# Patient Record
(demographics unavailable — no encounter records)

---

## 2024-12-17 NOTE — ASSESSMENT
[FreeTextEntry1] : OP, postmenopausal  Last DEXA 08/2024: AP spine: T score: -2.7, Left femoral neck: -1.7, Left total hip: -1.6  - labs as below. will call pt with results - Reports taking Prolia every 6 months. Tolerating medication well. No side effects.  - Due for 4th dose 03/2025. Was getting Prolia by Endocrinology at [facility]- she is unable to commute there now - Previous trial of alendronate: discontinued due to allergic reaction - Had extensive discussion with pt today about treatment options. At this time the decision was made to start treatment with SC Prolia. Next dose 03/2025. - calcium and vitamin d supplementation.  - regular exercise: aerobic and resistance - fall prevention - smoking cessation  Discussed treatment plan with the patient. The patient was given the opportunity to ask questions and all questions were answered to their satisfaction.

## 2024-12-17 NOTE — HISTORY OF PRESENT ILLNESS
[FreeTextEntry1] : 61 year old female with PMH as listed below presents today for an initial evaluation for OP  Recent DEXA 08/2024: AP spine: T score: -2.7 Left femoral neck: -1.7 Left total hip: -1.6  - Reports taking Prolia every 6 months. Tolerating medication well. No side effects. Due for 4th dose 03/2025 - Was getting Prolia by Endocrinology at [facility] [place]- but she is unable to commute there now - Previous trial of alendronate: discontinued due to allergic reaction - No history of fractures - No family history of hip fractures - Occasional smoker - No history of oral estrogen use - No falls at home - NO upcoming or planned jaw surgery ,extractions, etc.  Chart/ labs reviewed

## 2025-03-21 NOTE — ASSESSMENT
[FreeTextEntry1] : OP, postmenopausal  Last DEXA 08/2024: AP spine: T score: -2.7, Left femoral neck: -1.7, Left total hip: -1.6  - Reports taking Prolia every 6 months. Tolerating medication well. No side effects.  - Due for 4th dose 03/2025. Was getting Prolia by Endocrinology at [facility]- she is unable to commute there now - Previous trial of alendronate: discontinued due to allergic reaction - Had extensive discussion with pt today about treatment options. At this time the decision was made to c/w SC Prolia. Next dose 03/2025. Administered today.  - calcium and vitamin d supplementation.  - regular exercise: aerobic and resistance - fall prevention - smoking cessation  Discussed treatment plan with the patient. The patient was given the opportunity to ask questions and all questions were answered to their satisfaction.

## 2025-03-21 NOTE — HISTORY OF PRESENT ILLNESS
[FreeTextEntry1] : Pt presenting today for a f.u visit for OP.  Planned for get SC Prolia today.  No acute complaints today No falls. No fx since LV.